# Patient Record
Sex: MALE | NOT HISPANIC OR LATINO | Employment: UNEMPLOYED | URBAN - METROPOLITAN AREA
[De-identification: names, ages, dates, MRNs, and addresses within clinical notes are randomized per-mention and may not be internally consistent; named-entity substitution may affect disease eponyms.]

---
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
## 2023-04-28 ENCOUNTER — HOSPITAL ENCOUNTER (EMERGENCY)
Facility: CLINIC | Age: 23
Discharge: HOME | End: 2023-04-29
Attending: EMERGENCY MEDICINE | Admitting: EMERGENCY MEDICINE
Payer: COMMERCIAL

## 2023-04-28 VITALS
BODY MASS INDEX: 47.74 KG/M2 | HEIGHT: 68 IN | DIASTOLIC BLOOD PRESSURE: 71 MMHG | TEMPERATURE: 97.8 F | WEIGHT: 315 LBS | OXYGEN SATURATION: 98 % | HEART RATE: 95 BPM | RESPIRATION RATE: 16 BRPM | SYSTOLIC BLOOD PRESSURE: 126 MMHG

## 2023-04-28 DIAGNOSIS — N50.811 TESTICULAR PAIN, RIGHT: ICD-10-CM

## 2023-04-28 DIAGNOSIS — R82.81 PYURIA: ICD-10-CM

## 2023-04-28 PROCEDURE — 99284 EMERGENCY DEPT VISIT MOD MDM: CPT | Mod: 25

## 2023-04-28 PROCEDURE — 81003 URINALYSIS AUTO W/O SCOPE: CPT | Performed by: EMERGENCY MEDICINE

## 2023-04-28 PROCEDURE — 81001 URINALYSIS AUTO W/SCOPE: CPT | Performed by: EMERGENCY MEDICINE

## 2023-04-28 PROCEDURE — 87086 URINE CULTURE/COLONY COUNT: CPT | Performed by: EMERGENCY MEDICINE

## 2023-04-29 ENCOUNTER — APPOINTMENT (OUTPATIENT)
Dept: ULTRASOUND IMAGING | Facility: CLINIC | Age: 23
End: 2023-04-29
Attending: EMERGENCY MEDICINE
Payer: COMMERCIAL

## 2023-04-29 LAB
ALBUMIN UR-MCNC: 70 MG/DL
APPEARANCE UR: CLEAR
BACTERIA #/AREA URNS HPF: ABNORMAL /HPF
BILIRUB UR QL STRIP: NEGATIVE
COLOR UR AUTO: YELLOW
GLUCOSE UR STRIP-MCNC: NEGATIVE MG/DL
HGB UR QL STRIP: NEGATIVE
HYALINE CASTS: 10 /LPF
KETONES UR STRIP-MCNC: ABNORMAL MG/DL
LEUKOCYTE ESTERASE UR QL STRIP: ABNORMAL
MUCOUS THREADS #/AREA URNS LPF: PRESENT /LPF
NITRATE UR QL: NEGATIVE
PH UR STRIP: 5.5 [PH] (ref 5–7)
RBC URINE: 2 /HPF
SP GR UR STRIP: 1.03 (ref 1–1.03)
SQUAMOUS EPITHELIAL: 5 /HPF
UROBILINOGEN UR STRIP-MCNC: 2 MG/DL
WBC URINE: 13 /HPF

## 2023-04-29 PROCEDURE — 76870 US EXAM SCROTUM: CPT

## 2023-04-29 RX ORDER — LEVOFLOXACIN 500 MG/1
500 TABLET, FILM COATED ORAL DAILY
Qty: 10 TABLET | Refills: 0 | Status: SHIPPED | OUTPATIENT
Start: 2023-04-29 | End: 2023-05-09

## 2023-04-29 ASSESSMENT — ACTIVITIES OF DAILY LIVING (ADL): ADLS_ACUITY_SCORE: 35

## 2023-04-29 NOTE — ED TRIAGE NOTES
Triage Assessment     Row Name 04/28/23 1009       Triage Assessment (Adult)    Airway WDL WDL       Respiratory WDL    Respiratory WDL WDL       Skin Circulation/Temperature WDL    Skin Circulation/Temperature WDL WDL       Cardiac WDL    Cardiac WDL WDL       Peripheral/Neurovascular WDL    Peripheral Neurovascular WDL WDL       Cognitive/Neuro/Behavioral WDL    Cognitive/Neuro/Behavioral WDL WDL            Right testicular pain since this am. No known trauma or urinary pain. Denies flank or back pain.

## 2023-04-29 NOTE — ED PROVIDER NOTES
History   Chief Complaint:  R testicular/scrotal pain    \[facility]\""   History supplemented by electronic chart review    Jeovany Jernigan is a 22 year old male who presents with his father for evaluation of right-sided testicular and scrotal pain that started earlier today.  Somewhat fluctuating in intensity but not associated with any dysuria or hematuria, no trauma.  Feels somewhat similar to what he had a number of years ago.  He repeatedly denies, with father out of the room, any sexual intercourse in recent months and denies any possibility for sexually transmitted infection.  He has not taken any analgesics and does not want any.  He wants to make sure that he does not have testicular torsion.    Independent Historian: Father reports that a number of years ago the patient had similar symptoms and was given oral antibiotics with resolution of symptoms as an outpatient.    Review of External Notes: I personally performed electronic chart review, in 2018 he had an ultrasound showing possible mild left-sided epididymitis and he was treated with oral Levaquin.    Review of Systems:  All other systems reviewed and negative except as above in HPI.     Allergies:  Cefdinir     Medications:    levofloxacin (LEVAQUIN) 500 MG tablet  amphetamine-dextroamphetamine (ADDERALL XR) 25 MG 24 hr capsule  cholecalciferol 1000 UNITS TABS  lamoTRIgine (LAMICTAL) 100 MG tablet  MELATONIN PO  topiramate (TOPAMAX) 25 MG tablet  topiramate (TOPAMAX) 25 MG tablet  venlafaxine (EFFEXOR-XR) 75 MG 24 hr capsule        Past Medical History:    Past Medical History:   Diagnosis Date     Anxiety      Depression      Obesity      Rapid weight gain        Patient Active Problem List    Diagnosis Date Noted     Morbid obesity due to excess calories (H) 02/10/2017     Priority: Medium     Elevated LDL cholesterol level 02/10/2017     Priority: Medium     Vitamin D deficiency 02/10/2017     Priority: Medium     Prediabetes 10/06/2016     Priority:  "Medium     Snoring 10/06/2016     Priority: Medium     MDD (major depressive disorder) 10/14/2015     Priority: Medium     Depression 10/05/2015     Priority: Medium     Bug bite without infection 06/19/2015     Priority: Medium     Suicidal ideation 06/17/2015     Priority: Medium     ADHD (attention deficit hyperactivity disorder) 01/04/2012     Priority: Medium     Decreased gastrointestinal transit time 01/04/2012     Priority: Medium      Past Surgical History:    No past surgical history on file.     Family History:    family history includes Unknown/Adopted in his unknown relative.    Social History:  Here with father.  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  He uses marijuana but no alcohol or other drugs.    Physical Exam     Patient Vitals for the past 24 hrs:   BP Temp Temp src Pulse Resp SpO2 Height Weight   04/28/23 2305 126/71 97.8  F (36.6  C) Temporal 95 16 98 % 1.727 m (5' 8\") (!) 158.8 kg (350 lb)      Physical Exam  General: Nontoxic-appearing male sitting upright in the gurney in room 8, father initially at bedside though he excused himself for the majority of the history and exam  HENT: mucous membranes moist  CV: rate as above, regular rhythm  Resp: normal effort, speaks in full phrases, no stridor, no cough observed  GI: abdomen soft and nontender, no guarding, no palpable masses  MSK:   Thoracic spine: nontender, no CVAT  Lumbar spine: nontender  Pelvis stable.  : No appreciable tenderness or abnormal lie to the right testicle, right scrotum without swelling, no urethral discharge or bleeding, no abnormal skin findings of the genitalia  Skin: appropriately warm and dry, no erythema/ecchymosis/vesicles seen  Neuro: alert, clear speech, oriented  Psych: cooperative    Emergency Department Course     Imaging:  US Testicular & Scrotum w Doppler Ltd   Final Result   IMPRESSION:   1.  Normal sonographic appearance of the testicles.      2.  Small right varicocele. In some " patients, isolated right varicocele can be associated with the presence of retroperitoneal adenopathy. A CT of the abdomen and pelvis could be performed to exclude this possibility if clinically warranted.         Laboratory:  Labs Ordered and Resulted from Time of ED Arrival to Time of ED Departure   URINE MACROSCOPIC WITH REFLEX TO MICRO - Abnormal       Result Value    Color Urine Yellow      Appearance Urine Clear      Glucose Urine Negative      Bilirubin Urine Negative      Ketones Urine Trace (*)     Specific Gravity Urine 1.030      Blood Urine Negative      pH Urine 5.5      Protein Albumin Urine 70 (*)     Urobilinogen Urine 2.0      Nitrite Urine Negative      Leukocyte Esterase Urine Trace (*)     Bacteria Urine Few (*)     RBC Urine 2      WBC Urine 13 (*)     Squamous Epithelials Urine 5 (*)     Mucus Urine Present (*)     Hyaline Casts Urine 10 (*)    URINE CULTURE      Emergency Department Course:  Reviewed:  I reviewed nursing notes, vitals, and past medical history    Assessments/Consultations/Discussion of Management or Tests :  I obtained history and examined the patient as noted above.   ED Course as of 04/29/23 0109   Sat Apr 29, 2023   0025 I went to check on patient in room 8, he is not there as he is getting an ultrasound, but I spoke with his father briefly.  I conveyed my intent to return and the patient is back in ultrasound is read.   0050 Approximate time of my recheck in room 8, took history and physical exam.     Interventions:  Medications - No data to display     Social Determinants of Health affecting care:   None    Disposition:  Discharged    Impression & Plan    Medical Decision Making:  The patient was understandably concerned about the possibility of testicular torsion though his exam and ultrasound did not show evidence of this, and history is not highly suggestive either.  Vital signs are good.  I considered epididymitis as well, and this may be possible though again his  examination does not reveal focal tenderness to the epididymis nor does he have any evidence of scrotal abscess, necrotizing infection, or other more immediately serious or surgical process.  Given his modest pyuria, I explained to him the rationale for sending a urine culture and initiating oral antibiotics in the meantime.  He adamantly denies any possibility of sexually transmitted infection so testing for this was deferred and antibiotics were selected accordingly.  He should return for unbearable pain, high fevers, severe urinary difficulties or any other acute concerns.  Questions answered prior to his ambulatory discharge home in the care of his father.  He was offered a dose of oral antibiotic here but platelet Klein's, instead preferring simply to go home and fill it in the morning, which I do think is reasonable.    Diagnosis:    ICD-10-CM    1. Testicular pain, right  N50.811       2. Pyuria  R82.81          Discharge Prescriptions:  New Prescriptions    LEVOFLOXACIN (LEVAQUIN) 500 MG TABLET    Take 1 tablet (500 mg) by mouth daily for 10 days       4/29/2023   MD Larisa Kaur, Dylan Merritt MD  04/29/23 0115

## 2023-04-30 LAB — BACTERIA UR CULT: NORMAL

## 2023-08-01 ENCOUNTER — OFFICE VISIT (OUTPATIENT)
Dept: URGENT CARE | Facility: URGENT CARE | Age: 23
End: 2023-08-01
Payer: COMMERCIAL

## 2023-08-01 VITALS
HEART RATE: 82 BPM | DIASTOLIC BLOOD PRESSURE: 85 MMHG | WEIGHT: 315 LBS | BODY MASS INDEX: 50.18 KG/M2 | SYSTOLIC BLOOD PRESSURE: 140 MMHG | TEMPERATURE: 99.4 F | OXYGEN SATURATION: 96 %

## 2023-08-01 DIAGNOSIS — N50.812 PAIN IN BOTH TESTICLES: Primary | ICD-10-CM

## 2023-08-01 DIAGNOSIS — N50.811 PAIN IN BOTH TESTICLES: Primary | ICD-10-CM

## 2023-08-01 LAB
ALBUMIN UR-MCNC: NEGATIVE MG/DL
APPEARANCE UR: CLEAR
BILIRUB UR QL STRIP: ABNORMAL
COLOR UR AUTO: YELLOW
GLUCOSE UR STRIP-MCNC: NEGATIVE MG/DL
HGB UR QL STRIP: NEGATIVE
KETONES UR STRIP-MCNC: 15 MG/DL
LEUKOCYTE ESTERASE UR QL STRIP: NEGATIVE
NITRATE UR QL: NEGATIVE
PH UR STRIP: 6 [PH] (ref 5–7)
SP GR UR STRIP: >=1.03 (ref 1–1.03)
UROBILINOGEN UR STRIP-ACNC: 0.2 E.U./DL

## 2023-08-01 PROCEDURE — 87491 CHLMYD TRACH DNA AMP PROBE: CPT | Performed by: EMERGENCY MEDICINE

## 2023-08-01 PROCEDURE — 87591 N.GONORRHOEAE DNA AMP PROB: CPT | Performed by: EMERGENCY MEDICINE

## 2023-08-01 PROCEDURE — 81003 URINALYSIS AUTO W/O SCOPE: CPT | Performed by: EMERGENCY MEDICINE

## 2023-08-01 PROCEDURE — 99203 OFFICE O/P NEW LOW 30 MIN: CPT | Performed by: EMERGENCY MEDICINE

## 2023-08-01 NOTE — PROGRESS NOTES
Assessment & Plan     Diagnosis:    ICD-10-CM    1. Pain in both testicles  N50.811 Referral to Acute and Diagnostic Services (Day of diagnostic / First order acute)    N50.812 UA Macroscopic with reflex to Microscopic and Culture - Lab Collect     Chlamydia trachomatis/Neisseria gonorrhoeae by PCR - Clinic Collect     UA Macroscopic with reflex to Microscopic and Culture - Lab Collect          Medical Decision Making:  Jeovany Jernigan is a 23 year old male who comes with testicular pain. On exam there is a normal lie and no clinical evidence of torsion. Patient notes pain is not present currently unless he is in certain positions. However, on exam he does have tenderness over the epididymis on exam on the right.  He has a history of epididymitis about 4 months ago which presented similarly, he is treated with Levaquin and improved on this.  Patient is adamant that he is not sexually active, has not been for many years and is not concerned for STDs.  He does note years ago that he had a unfaithful ex-girlfriend, he is agreeable to have testing performed. think kidney stone is less likely since he does have palpable pain to the testicle and there is no blood in his urine. There is no evidence of hernia on exam. I think strangulated or incarcerated hernia is less likely since he is not having any vomiting or stool changes.  I discussed without ultrasound we are unable to rule out torsion or other nefarious causes of testicular pain, he is adamant he will not go to the ER tonight and less severe pain.  I discussed going to the [facility] in [place] tomorrow morning for epididymitis work-up, he is agreeable with this plan.  He also understands need to go to the ER immediately if his pain worsens in any way and understands risks of potential testicular loss and complications of testicular torsion.    Referral to Acute and Diagnostic Services    [phone] ([place]) [facility] [address], [address], [place]  [address]    Transition to Acute & Diagnostic Services Clinic has been discussed with patient, and he agrees with next level of care.   Patient understands that evaluation/treatment at [facility] typically takes significantly longer than in clinic/urgent care (>2 hours).  The [facility] has been contacted by provider/staff to confirm patient acceptance.         Special issues:      None                                     Magdi Powers PA-C  [facility]    Subjective     Jeovany Jernigan is a 23 year old male who presents to clinic today for the following health issues:  Chief Complaint   Patient presents with    Testicular Problem     Pain since 3-4 days ago, had a infection 3 months ago also, no injury       HPI  States for last 3 to 4 days been experiencing testicular pain, had a infection called epididymitis a few months ago and one of his testicles, is concerned for the same.  He notes no injury, is not sexually active and is adamant that he is not having any concerns for STDs, penile discharge concerns for STDs, penile discharge, rashes etc.  Notes no abdominal pain, back pain, flank pain, nausea, vomiting.  No history of kidney stones.  States that he also had a problem with this in 2018, but is not having any symptoms up until a few months ago.  Pain is getting progressively worse in the last 24 hours especially on the right testicle. No swelling noted.     Review of Systems    See HPI    Objective      Vitals: BP (!) 140/85 (BP Location: Left arm, Patient Position: Sitting, Cuff Size: Adult Regular)   Pulse 82   Temp 99.4  F (37.4  C) (Tympanic)   Wt 149.7 kg (330 lb)   SpO2 96%   BMI 50.18 kg/m        Patient Vitals for the past 24 hrs:   BP Temp Temp src Pulse SpO2 Weight   08/01/23 1829 (!) 140/85 99.4  F (37.4  C) Tympanic 82 96 % 149.7 kg (330 lb)       Vital signs reviewed by: Magdi Powers PA-C    Physical Exam   Constitutional: Patient  is alert and cooperative. No acute distress.  Cardiovascular: Regular rate and rhythm  Pulmonary/Chest: Lungs are clear to auscultation throughout. Effort normal. No respiratory distress. No wheezes, rales or rhonchi.  GI: Abdomen is soft and non-tender throughout. No CVA tenderness bilaterally.  : Tenderness over the right epididymis with slight full-feeling/swelling noted.  Testicles are symmetric in size, shape and are nontender and lie normally. No scrotal swelling.  No rashes or lesions.  No inguinal lymphadenopathy or hernias.  Neurological: Alert and oriented x3.   Skin: No rash noted on visualized skin.  Psychiatric:The patient has a normal mood and affect.     Labs/Imaging:  Results for orders placed or performed in visit on 08/01/23   UA Macroscopic with reflex to Microscopic and Culture - Lab Collect     Status: Abnormal    Specimen: Urine, Midstream   Result Value Ref Range    Color Urine Yellow Colorless, Straw, Light Yellow, Yellow    Appearance Urine Clear Clear    Glucose Urine Negative Negative mg/dL    Bilirubin Urine Small (A) Negative    Ketones Urine 15 (A) Negative mg/dL    Specific Gravity Urine >=1.030 1.003 - 1.035    Blood Urine Negative Negative    pH Urine 6.0 5.0 - 7.0    Protein Albumin Urine Negative Negative mg/dL    Urobilinogen Urine 0.2 0.2, 1.0 E.U./dL    Nitrite Urine Negative Negative    Leukocyte Esterase Urine Negative Negative    Narrative    Microscopic not indicated       Magdi Powers PA-C, August 1, 2023

## 2023-08-02 ENCOUNTER — HOSPITAL ENCOUNTER (OUTPATIENT)
Dept: ULTRASOUND IMAGING | Facility: CLINIC | Age: 23
Discharge: HOME | End: 2023-08-02
Attending: INTERNAL MEDICINE | Admitting: INTERNAL MEDICINE
Payer: COMMERCIAL

## 2023-08-02 ENCOUNTER — OFFICE VISIT (OUTPATIENT)
Dept: PEDIATRICS | Facility: CLINIC | Age: 23
End: 2023-08-02
Payer: COMMERCIAL

## 2023-08-02 VITALS
TEMPERATURE: 98.9 F | DIASTOLIC BLOOD PRESSURE: 101 MMHG | OXYGEN SATURATION: 97 % | RESPIRATION RATE: 16 BRPM | HEART RATE: 99 BPM | SYSTOLIC BLOOD PRESSURE: 144 MMHG

## 2023-08-02 DIAGNOSIS — N50.812 PAIN IN BOTH TESTICLES: ICD-10-CM

## 2023-08-02 DIAGNOSIS — I86.1 VARICOCELE: ICD-10-CM

## 2023-08-02 DIAGNOSIS — N50.811 PAIN IN BOTH TESTICLES: ICD-10-CM

## 2023-08-02 DIAGNOSIS — N45.1 EPIDIDYMITIS WITH NO ABSCESS: Primary | ICD-10-CM

## 2023-08-02 LAB
ALBUMIN SERPL BCG-MCNC: 4.3 G/DL (ref 3.5–5.2)
ALP SERPL-CCNC: 93 U/L (ref 40–129)
ALT SERPL W P-5'-P-CCNC: 27 U/L (ref 0–70)
ANION GAP SERPL CALCULATED.3IONS-SCNC: 15 MMOL/L (ref 7–15)
AST SERPL W P-5'-P-CCNC: 17 U/L (ref 0–45)
BILIRUB SERPL-MCNC: 0.2 MG/DL
BUN SERPL-MCNC: 12.9 MG/DL (ref 6–20)
C TRACH DNA SPEC QL PROBE+SIG AMP: NEGATIVE
CALCIUM SERPL-MCNC: 9.7 MG/DL (ref 8.6–10)
CHLORIDE SERPL-SCNC: 100 MMOL/L (ref 98–107)
CREAT SERPL-MCNC: 0.84 MG/DL (ref 0.67–1.17)
DEPRECATED HCO3 PLAS-SCNC: 23 MMOL/L (ref 22–29)
ERYTHROCYTE [DISTWIDTH] IN BLOOD BY AUTOMATED COUNT: 14 % (ref 10–15)
GFR SERPL CREATININE-BSD FRML MDRD: >90 ML/MIN/1.73M2
GLUCOSE SERPL-MCNC: 121 MG/DL (ref 70–99)
HCT VFR BLD AUTO: 44.8 % (ref 40–53)
HGB BLD-MCNC: 14.2 G/DL (ref 13.3–17.7)
MCH RBC QN AUTO: 24.9 PG (ref 26.5–33)
MCHC RBC AUTO-ENTMCNC: 31.7 G/DL (ref 31.5–36.5)
MCV RBC AUTO: 79 FL (ref 78–100)
N GONORRHOEA DNA SPEC QL NAA+PROBE: NEGATIVE
PLATELET # BLD AUTO: 279 10E3/UL (ref 150–450)
POTASSIUM SERPL-SCNC: 4 MMOL/L (ref 3.4–5.3)
PROT SERPL-MCNC: 7.8 G/DL (ref 6.4–8.3)
RBC # BLD AUTO: 5.7 10E6/UL (ref 4.4–5.9)
SODIUM SERPL-SCNC: 138 MMOL/L (ref 136–145)
WBC # BLD AUTO: 9.1 10E3/UL (ref 4–11)

## 2023-08-02 PROCEDURE — 80053 COMPREHEN METABOLIC PANEL: CPT | Performed by: INTERNAL MEDICINE

## 2023-08-02 PROCEDURE — 85027 COMPLETE CBC AUTOMATED: CPT | Performed by: INTERNAL MEDICINE

## 2023-08-02 PROCEDURE — 36415 COLL VENOUS BLD VENIPUNCTURE: CPT | Performed by: INTERNAL MEDICINE

## 2023-08-02 PROCEDURE — 99204 OFFICE O/P NEW MOD 45 MIN: CPT | Performed by: INTERNAL MEDICINE

## 2023-08-02 PROCEDURE — 76870 US EXAM SCROTUM: CPT

## 2023-08-02 RX ORDER — LEVOFLOXACIN 500 MG/1
500 TABLET, FILM COATED ORAL DAILY
Qty: 10 TABLET | Refills: 0 | Status: SHIPPED | OUTPATIENT
Start: 2023-08-02 | End: 2023-08-11

## 2023-08-02 NOTE — PROGRESS NOTES
"  Assessment & Plan     Epididymitis with no abscess  Presentation is similar to prior.  We will treat as he responded prior and will be traveling to [place] later today for the next several days.    Risks and SE of abx and precautions on seeking additional care discussed.     The patient will benefit from Urology consultation.      Pain in both testicles    - US Testicular & Scrotum w Doppler Ltd  - CBC with platelets  - Comprehensive metabolic panel  - CBC with platelets  - Comprehensive metabolic panel    Varicocele  Hydrocele also.  Likely incidental.        I spent 44 minutes in direct patient care, chart review and documentation.       Nicotine/Tobacco Cessation:  He reports that he has been smoking. He has never used smokeless tobacco.  Nicotine/Tobacco Cessation Plan:         BMI:   Estimated body mass index is 50.18 kg/m  as calculated from the following:    Height as of 4/28/23: 1.727 m (5' 8\").    Weight as of 8/1/23: 149.7 kg (330 lb).           No follow-ups on file.    Edenilson Venegas MD  [facility] NICOLLE Thayer is a 23 year old, presenting for the following health issues:  Pain    Pt c/o B testicular pain since Friday.  Noted R testicular pain which progressed to both testicles over the weekend.  The pain comes and goes in waves.      Does not wake him up out of sleep.    No objective fever  Does have occasional constipation.    No flank pain reported    Mild BLQ abdominal pain.    Pt had similar in April.  Went to ER, was treated for epididymitis.    Prior to that had similar for the first time in 2018, also treated for epididymitis.      Ultrasound 4/2023:  IMPRESSION:  1.  Normal sonographic appearance of the testicles.     2.  Small right varicocele. In some patients, isolated right varicocele can be associated with the presence of retroperitoneal adenopathy. A CT of the abdomen and pelvis could be performed to exclude this possibility if clinically " warranted.    Ultrasound 2/2018:  IMPRESSION: Slight asymmetric blood flow, increased slightly in the  left testicle and epididymis in relation to the right, which could  reflect mild epididymitis or orchitis. No evidence of torsion.  Recommend clinical correlation.        HPI     Testicular Pain  Onset/Duration: Friday it was persistent   Urinary symptoms:   Dysuria (painful urination): No  Hematuria (blood in urine): No  Frequency: No  Waking at night to urinate : No  Hesitancy (delay in urine): No  Retention (unable to empty): N/A  Decrease in urinary flow: No  Incontinence: No  Progression of Symptoms:  same  Accompanying Signs & Symptoms:  Fever: YES  Back/Flank pain: No  Urethral discharge: No  Testicle lumps/masses: YES  Nausea and/or vomiting: No  Abdominal pain: YES  History:   Sexually active: No  Precipitating or alleviating factors: sitting down, letting it hang freely   Therapies tried and outcome:  ibuprofen - reduces but still hurts         Review of Systems         Objective    BP (!) 144/101 (BP Location: Left arm, Patient Position: Chair, Cuff Size: Adult Regular)   Pulse 99   Temp 98.9  F (37.2  C) (Oral)   Resp 16   SpO2 97%   There is no height or weight on file to calculate BMI.  Physical Exam   GEN NAD  ENT MMM  CV RRR  ABD Obese, +BS  :  normal male genitalia.  R posterior testicle TTP w/o obvious mass.

## 2023-08-02 NOTE — PATIENT INSTRUCTIONS
START:  Levofloxacin once a day every day.  This class of antibiotics has a very rare side effect of tendinitis.  If you feel sudden tightness or pain in tendons (such as achilles) STOP the antibiotic.     Since you've been on this before this is a highly unlikely complication for you.    We placed a referral to UROLOGY to look into why this has happened recurrently. They will call you to schedule a consultation.

## 2023-08-11 ENCOUNTER — MYC REFILL (OUTPATIENT)
Dept: PEDIATRICS | Facility: CLINIC | Age: 23
End: 2023-08-11
Payer: COMMERCIAL

## 2023-08-11 DIAGNOSIS — N45.1 EPIDIDYMITIS WITH NO ABSCESS: ICD-10-CM

## 2023-08-14 ENCOUNTER — MYC REFILL (OUTPATIENT)
Dept: PEDIATRICS | Facility: CLINIC | Age: 23
End: 2023-08-14
Payer: COMMERCIAL

## 2023-08-14 DIAGNOSIS — N45.1 EPIDIDYMITIS WITH NO ABSCESS: ICD-10-CM

## 2023-08-14 RX ORDER — LEVOFLOXACIN 500 MG/1
500 TABLET, FILM COATED ORAL DAILY
Qty: 10 TABLET | Refills: 0 | Status: SHIPPED | OUTPATIENT
Start: 2023-08-14 | End: 2023-08-15

## 2023-08-15 ENCOUNTER — VIRTUAL VISIT (OUTPATIENT)
Dept: FAMILY MEDICINE | Facility: CLINIC | Age: 23
End: 2023-08-15
Payer: COMMERCIAL

## 2023-08-15 DIAGNOSIS — N45.1 EPIDIDYMITIS WITH NO ABSCESS: ICD-10-CM

## 2023-08-15 DIAGNOSIS — F32.9 CURRENT EPISODE OF MAJOR DEPRESSIVE DISORDER WITHOUT PRIOR EPISODE, UNSPECIFIED DEPRESSION EPISODE SEVERITY: ICD-10-CM

## 2023-08-15 DIAGNOSIS — N50.811 TESTICULAR PAIN, RIGHT: Primary | ICD-10-CM

## 2023-08-15 PROCEDURE — 99214 OFFICE O/P EST MOD 30 MIN: CPT | Mod: VID | Performed by: INTERNAL MEDICINE

## 2023-08-15 PROCEDURE — 96127 BRIEF EMOTIONAL/BEHAV ASSMT: CPT | Performed by: INTERNAL MEDICINE

## 2023-08-15 RX ORDER — LEVOFLOXACIN 500 MG/1
500 TABLET, FILM COATED ORAL DAILY
Qty: 28 TABLET | Refills: 0 | Status: SHIPPED | OUTPATIENT
Start: 2023-08-15 | End: 2023-08-15

## 2023-08-15 RX ORDER — LEVOFLOXACIN 500 MG/1
500 TABLET, FILM COATED ORAL DAILY
Qty: 10 TABLET | Refills: 0 | OUTPATIENT
Start: 2023-08-15

## 2023-08-15 RX ORDER — LEVOFLOXACIN 500 MG/1
500 TABLET, FILM COATED ORAL DAILY
Qty: 10 TABLET | Refills: 0 | Status: SHIPPED | OUTPATIENT
Start: 2023-08-15

## 2023-08-15 ASSESSMENT — PATIENT HEALTH QUESTIONNAIRE - PHQ9
SUM OF ALL RESPONSES TO PHQ QUESTIONS 1-9: 14
10. IF YOU CHECKED OFF ANY PROBLEMS, HOW DIFFICULT HAVE THESE PROBLEMS MADE IT FOR YOU TO DO YOUR WORK, TAKE CARE OF THINGS AT HOME, OR GET ALONG WITH OTHER PEOPLE: EXTREMELY DIFFICULT
SUM OF ALL RESPONSES TO PHQ QUESTIONS 1-9: 14

## 2023-08-15 NOTE — PROGRESS NOTES
"Jeovany is a 23 year old who is being evaluated via a billable video visit.      How would you like to obtain your AVS? MyChart  If the video visit is dropped, the invitation should be resent by: Text to cell phone: [phone]  Will anyone else be joining your video visit? No        Assessment & Plan     Epididymitis with no abscess  10 more days of levaquin 500 mg daily. PSA ordered.   If symptoms do not improve, will see what PSA levels are. If PSA elevated, will treat it as prostatitis with 4 weeks of levaquin.  - levofloxacin (LEVAQUIN) 500 MG tablet; Take 1 tablet (500 mg) by mouth daily     MED REC REQUIRED  Post Medication Reconciliation Status:     BMI:   Estimated body mass index is 50.18 kg/m  as calculated from the following:    Height as of 4/28/23: 1.727 m (5' 8\").    Weight as of 8/1/23: 149.7 kg (330 lb).   Weight management plan: Discussed healthy diet and exercise guidelines    Depression Screening Follow Up        8/15/2023     2:37 PM   PHQ   PHQ-9 Total Score 14   Q9: Thoughts of better off dead/self-harm past 2 weeks Several days   F/U: Thoughts of suicide or self-harm Yes   F/U: Self harm-plan No   F/U: Self-harm action No   F/U: Safety concerns No         8/15/2023     2:37 PM   Last PHQ-9   1.  Little interest or pleasure in doing things 2   2.  Feeling down, depressed, or hopeless 2   3.  Trouble falling or staying asleep, or sleeping too much 2   4.  Feeling tired or having little energy 2   5.  Poor appetite or overeating 2   6.  Feeling bad about yourself 2   7.  Trouble concentrating 0   8.  Moving slowly or restless 1   Q9: Thoughts of better off dead/self-harm past 2 weeks 1   PHQ-9 Total Score 14   In the past two weeks have you had thoughts of suicide or self harm? Yes   Do you have concerns about your personal safety or the safety of others? No   In the past 2 weeks have you thought about a plan or had intention to harm yourself? No   In the past 2 weeks have you acted on these " thoughts in any way? No     Follow Up      Follow Up Actions Taken  Crisis resource information provided in the After Visit Summary  Mental Health Referral placed    Discussed the following ways the patient can remain in a safe environment:  be around others  See Patient Instructions    Florence Day MD  [facility] 23 [facility], presenting for the following health issues:  Testicular Problem (Pt seen in UC and [facility] 8/1-8/2)      VERA Thayer is a very pleasant 23-year-old gentleman who had a virtual visit today.  He was seen in the clinic about 10 days ago for testicular pain.  Blood work including CBC CMP was unremarkable.  Ultrasound showed minimal right varicocele and minimal left hydrocele.  No testicular torsion was seen.  He was given Levaquin 500 mg once daily for 10 days for possible epididymitis.  Patient reports that the pain has returned after completing 10 days of antibiotics.  He was given another 10 days of antibiotics yesterday.    When asked about mental health questionnaire, he reports that he has suicidal ideation every now and then.  He reports previously was on Lexapro but discontinued.  He also did not see any improvement with psychotherapy.  He is not interested in treatment at this time.    Review of Systems       Objective    Vitals - Patient Reported  Pain Score: Severe Pain (6)      Vitals:  No vitals were obtained today due to virtual visit.    Physical Exam   GEN: No acute distress  RESP: No audible increased work of breathing. Patient speaking in full sentences without distress.  PSYCH: pleasant  Exam otherwise limited due to virtual platform        Video-Visit Details    Type of service:  Video Visit     Originating Location (pt. Location): Home  Distant Location (provider location):  On-site  Platform used for Video Visit: Cinemagram

## 2023-08-16 ENCOUNTER — APPOINTMENT (OUTPATIENT)
Dept: LAB | Facility: CLINIC | Age: 23
End: 2023-08-16
Payer: COMMERCIAL

## 2023-08-23 ENCOUNTER — LAB (OUTPATIENT)
Dept: LAB | Facility: CLINIC | Age: 23
End: 2023-08-23
Payer: COMMERCIAL

## 2023-08-23 DIAGNOSIS — N50.811 TESTICULAR PAIN, RIGHT: ICD-10-CM

## 2023-08-23 LAB — PSA SERPL DL<=0.01 NG/ML-MCNC: 0.24 NG/ML

## 2023-08-23 PROCEDURE — G0103 PSA SCREENING: HCPCS

## 2023-08-23 PROCEDURE — 36415 COLL VENOUS BLD VENIPUNCTURE: CPT

## 2023-09-11 ENCOUNTER — HOSPITAL ENCOUNTER (OUTPATIENT)
Dept: CT IMAGING | Facility: CLINIC | Age: 23
Discharge: HOME | End: 2023-09-11
Attending: PHYSICIAN ASSISTANT | Admitting: PHYSICIAN ASSISTANT
Payer: COMMERCIAL

## 2023-09-11 DIAGNOSIS — R10.9 RIGHT FLANK PAIN: ICD-10-CM

## 2023-09-11 PROCEDURE — 74176 CT ABD & PELVIS W/O CONTRAST: CPT

## 2023-09-16 ENCOUNTER — HEALTH MAINTENANCE LETTER (OUTPATIENT)
Age: 23
End: 2023-09-16

## 2023-10-09 ENCOUNTER — OFFICE VISIT (OUTPATIENT)
Dept: UROLOGY | Facility: CLINIC | Age: 23
End: 2023-10-09
Payer: COMMERCIAL

## 2023-10-09 VITALS
SYSTOLIC BLOOD PRESSURE: 136 MMHG | HEIGHT: 68 IN | BODY MASS INDEX: 47.74 KG/M2 | WEIGHT: 315 LBS | DIASTOLIC BLOOD PRESSURE: 86 MMHG | OXYGEN SATURATION: 98 %

## 2023-10-09 DIAGNOSIS — N50.812 PAIN IN BOTH TESTICLES: ICD-10-CM

## 2023-10-09 DIAGNOSIS — I86.1 VARICOCELE: ICD-10-CM

## 2023-10-09 DIAGNOSIS — N50.811 PAIN IN BOTH TESTICLES: ICD-10-CM

## 2023-10-09 DIAGNOSIS — N45.1 EPIDIDYMITIS WITH NO ABSCESS: ICD-10-CM

## 2023-10-09 PROCEDURE — 99204 OFFICE O/P NEW MOD 45 MIN: CPT | Performed by: STUDENT IN AN ORGANIZED HEALTH CARE EDUCATION/TRAINING PROGRAM

## 2023-10-09 RX ORDER — GABAPENTIN 100 MG/1
100 CAPSULE ORAL 3 TIMES DAILY
Qty: 90 CAPSULE | Refills: 1 | Status: SHIPPED | OUTPATIENT
Start: 2023-10-09

## 2023-10-09 ASSESSMENT — PAIN SCALES - GENERAL: PAINLEVEL: MILD PAIN (3)

## 2023-10-09 NOTE — NURSING NOTE
Chief Complaint   Patient presents with    varicocele    Bilat groin pain   No blood in the urine  Also ct results done on 09-  Fernie Denton, clinic assistant

## 2023-10-09 NOTE — LETTER
10/9/2023       RE: Jeovany Jernigan  [address] [place] [address]     Dear Colleague,    Thank you for referring your patient, Jeovany Jernigan, to the [facility] [place] at [facility]. Please see a copy of my visit note below.    [facility]  Main Office: [address]  [address]  [place] [address]       CHIEF COMPLAINT:      HISTORY:   22 yo M w/ Body mass index is 52.46 kg/m ., anxiety, here for intermittent abdominal and scrotal pain. For about the past 5 months he has complained of this intermittent pain. At the moment he says he is having right testicular pain which is radiating proximally up the spermatic cord. Previously was rather constant but at the moment it seems that a few times a day he has sharp pain which lasts a few hours. Sometimes the pain switches over to the left side. Denies any  trauma.     Has previously been treated as epididymitis with several courses of levaquin, he does not feel like there was improvement in pain. Not currently on antibiotics    No surgery ( or otherwise).      PAST MEDICAL HISTORY:   Past Medical History:   Diagnosis Date    Anxiety     Depression     Epididymitis, bilateral     Hernia, abdominal     Hydrocephalus (H)     Obesity     Rapid weight gain        PAST SURGICAL HISTORY: History reviewed. No pertinent surgical history.    FAMILY HISTORY:   Family History   Problem Relation Age of Onset    Unknown/Adopted Other        SOCIAL HISTORY:   Social History     Tobacco Use    Smoking status: Every Day    Smokeless tobacco: Never    Tobacco comments:     Ecig   Substance Use Topics    Alcohol use: Yes     Alcohol/week: 0.0 standard drinks of alcohol     Comment: Has drank vodka, didn't like it.  Last use couple months ago.          Allergies   Allergen Reactions    Cefdinir Hives         Current Outpatient Medications:     levofloxacin (LEVAQUIN) 500 MG tablet, Take 1  "tablet (500 mg) by mouth daily (Patient not taking: Reported on 10/9/2023), Disp: 10 tablet, Rfl: 0    Review Of Systems:  Skin: No rash, pruritis, or skin pigmentation  Eyes: No changes in vision  Ears/Nose/Throat: No changes in hearing, no nosebleeds  Respiratory: No shortness of breath, dyspnea on exertion, cough, or hemoptysis  Cardiovascular: No chest pain or palpitations  Gastrointestinal: No diarrhea or constipation. No abdominal pain. No hematochezia  Genitourinary: see HPI  Musculoskeletal: No pain or swelling of joints, normal range of motion  Neurologic: No weakness or tremors  Psychiatric: No recent changes in memory or mood  Hematologic/Lymphatic/Immunologic: No easy bruising or enlarged lymph nodes  Endocrine: No weight gain or loss      PHYSICAL EXAM:    /86   Ht 1.727 m (5' 8\")   Wt (!) 156.5 kg (345 lb)   SpO2 98%   BMI 52.46 kg/m    General appearance: In NAD, conversant  HEENT: Normocephalic and atraumatic, anicteric sclera  Cardiovascular: Not examined  Respiratory: normal, non-labored breathing  Gastrointestinal: morbid obesity  Musculoskeletal: Not Examined  Peripheral Vascular/extremity: No peripheral edema  Skin: Normal temperature, turgor, and texture. No rash  Psychiatric: Appropriate affect, alert and oriented to person, place, and time    Penis: circ phallus, complete buried by body habitus  Scrotal Skin: Normal scrotal skin  Testicles: normal bilaterally, no significant varicocele  Epididymis: Normal bilat  Digital Rectal Exam: deferred  No significant inguinal hernia palpable    Cystoscopy: Not done      PSA: ____    UA RESULTS:  Recent Labs   Lab Test 08/01/23  1855 04/28/23  2315   COLOR Yellow Yellow   APPEARANCE Clear Clear   URINEGLC Negative Negative   URINEBILI Small* Negative   URINEKETONE 15* Trace*   SG >=1.030 1.030   UBLD Negative Negative   URINEPH 6.0 5.5   PROTEIN Negative 70*   UROBILINOGEN 0.2  --    NITRITE Negative Negative   LEUKEST Negative Trace*   RBCU  " --  2   WBCU  --  13*       Bladder Scan:     Other Labs:      Imaging Studies:   CT abd/pelvis w/o contrast 9/11/2023      Reviewed and interpreted personally. No stones.        Small left sided inguinal hernia        CLINICAL IMPRESSION:       Discussed left inguinal hernia on CT, not symptomatic, not palpable. Return precautions of worsening pain, nausea vomiting, fever etc.    Chronic orchalgia, R>L. No palpable varicocele or other abnormality of testicle or cord. Doubtful this was recurrent epididymitis.  Notably, no epididymitis seen on prior ultrasounds when symptomatic. Testicular ultrasounds have been essentially normal. There is no clinical left hydrocele on exam, this fluid on the scrota ultrasound 8/2/2023 is physiologic    Discussed trial of spermatic cord block to see if this improves his symptoms, however the pain seems intermittent and sometimes is on contralateral so he is not interested in this at this time. There is also a question of whether this is more pelvic pain as opposed to true scrotal pain, but he declines a digital rectal exam and says he would rather deal with this issue for the rest of his life than go through any invasive physical therapy or exam    He has been off of velafaxine, lamotrigine and topiramate for the last few months    I offered trial of gabapentin. Will start at low dose 100 mg tid.    PLAN:   Gabapentin 100 mg three times a day. Can dose titrate slowly, to max of 300 mg three times a day    Return 3 months    Filippo Wick MD   [facility]  [phone] clinic phone

## 2023-10-09 NOTE — PROGRESS NOTES
[facility]  Main Office: [address]  [address]  [place] [address]       CHIEF COMPLAINT:  Abdominal and scrotal pain    HISTORY:   24 yo M w/ Body mass index is 52.46 kg/m ., anxiety, here for intermittent abdominal and scrotal pain. For about the past 5 months he has complained of this intermittent pain. At the moment he says he is having right testicular pain which is radiating proximally up the spermatic cord. Previously was rather constant but at the moment it seems that a few times a day he has sharp pain which lasts a few hours. Sometimes the pain switches over to the left side. Denies any  trauma.     Has previously been treated as epididymitis with several courses of levaquin, he does not feel like there was improvement in pain. Not currently on antibiotics    No surgery ( or otherwise).      PAST MEDICAL HISTORY:   Past Medical History:   Diagnosis Date    Anxiety     Depression     Epididymitis, bilateral     Hernia, abdominal     Hydrocephalus (H)     Obesity     Rapid weight gain        PAST SURGICAL HISTORY: History reviewed. No pertinent surgical history.    FAMILY HISTORY:   Family History   Problem Relation Age of Onset    Unknown/Adopted Other        SOCIAL HISTORY:   Social History     Tobacco Use    Smoking status: Every Day    Smokeless tobacco: Never    Tobacco comments:     Ecig   Substance Use Topics    Alcohol use: Yes     Alcohol/week: 0.0 standard drinks of alcohol     Comment: Has drank vodka, didn't like it.  Last use couple months ago.          Allergies   Allergen Reactions    Cefdinir Hives         Current Outpatient Medications:     levofloxacin (LEVAQUIN) 500 MG tablet, Take 1 tablet (500 mg) by mouth daily (Patient not taking: Reported on 10/9/2023), Disp: 10 tablet, Rfl: 0    Review Of Systems:  Skin: No rash, pruritis, or skin pigmentation  Eyes: No changes in vision  Ears/Nose/Throat: No changes in hearing, no nosebleeds  Respiratory: No shortness of breath,  "dyspnea on exertion, cough, or hemoptysis  Cardiovascular: No chest pain or palpitations  Gastrointestinal: No diarrhea or constipation. No abdominal pain. No hematochezia  Genitourinary: see HPI  Musculoskeletal: No pain or swelling of joints, normal range of motion  Neurologic: No weakness or tremors  Psychiatric: No recent changes in memory or mood  Hematologic/Lymphatic/Immunologic: No easy bruising or enlarged lymph nodes  Endocrine: No weight gain or loss      PHYSICAL EXAM:    /86   Ht 1.727 m (5' 8\")   Wt (!) 156.5 kg (345 lb)   SpO2 98%   BMI 52.46 kg/m    General appearance: In NAD, conversant  HEENT: Normocephalic and atraumatic, anicteric sclera  Cardiovascular: Not examined  Respiratory: normal, non-labored breathing  Gastrointestinal: morbid obesity  Musculoskeletal: Not Examined  Peripheral Vascular/extremity: No peripheral edema  Skin: Normal temperature, turgor, and texture. No rash  Psychiatric: Appropriate affect, alert and oriented to person, place, and time    Penis: circ phallus, complete buried by body habitus  Scrotal Skin: Normal scrotal skin  Testicles: normal bilaterally, no significant varicocele  Epididymis: Normal bilat  Digital Rectal Exam: deferred  No significant inguinal hernia palpable    Cystoscopy: Not done      UA RESULTS:  Recent Labs   Lab Test 08/01/23  1855 04/28/23  2315   COLOR Yellow Yellow   APPEARANCE Clear Clear   URINEGLC Negative Negative   URINEBILI Small* Negative   URINEKETONE 15* Trace*   SG >=1.030 1.030   UBLD Negative Negative   URINEPH 6.0 5.5   PROTEIN Negative 70*   UROBILINOGEN 0.2  --    NITRITE Negative Negative   LEUKEST Negative Trace*   RBCU  --  2   WBCU  --  13*       Bladder Scan:     Other Labs:      Imaging Studies:   CT abd/pelvis w/o contrast 9/11/2023      Reviewed and interpreted personally. No stones.        Small left sided inguinal hernia        CLINICAL IMPRESSION:   22 yo M w/ Body mass index is 52.46 kg/m ., anxiety, here for " intermittent abdominal and scrotal pain.     Discussed left inguinal hernia on CT, not symptomatic, not palpable. Return precautions of worsening pain, nausea vomiting, fever etc.    Chronic orchalgia, R>L. No palpable varicocele or other abnormality of testicle or cord. Doubtful this was recurrent epididymitis.  Notably, no epididymitis seen on prior ultrasounds when symptomatic. Testicular ultrasounds have been essentially normal. There is no clinical left hydrocele on exam, this fluid on the scrota ultrasound 8/2/2023 is physiologic    Discussed trial of spermatic cord block to see if this improves his symptoms, however the pain seems intermittent and sometimes is on contralateral so he is not interested in this at this time. There is also a question of whether this is more pelvic pain as opposed to true scrotal pain, but he declines a digital rectal exam and says he would rather deal with this issue for the rest of his life than go through any invasive physical therapy or exam    He has been off of velafaxine, lamotrigine and topiramate for the last few months    I offered trial of gabapentin. Will start at low dose 100 mg tid.    PLAN:   Gabapentin 100 mg three times a day. Can dose titrate slowly, to max of 300 mg three times a day    Return 3 months    Filippo Wick MD   [facility]  [phone] clinic phone

## 2024-11-09 ENCOUNTER — HEALTH MAINTENANCE LETTER (OUTPATIENT)
Age: 24
End: 2024-11-09